# Patient Record
Sex: FEMALE | Race: WHITE | NOT HISPANIC OR LATINO | Employment: PART TIME | URBAN - METROPOLITAN AREA
[De-identification: names, ages, dates, MRNs, and addresses within clinical notes are randomized per-mention and may not be internally consistent; named-entity substitution may affect disease eponyms.]

---

## 2021-04-20 ENCOUNTER — OFFICE VISIT (OUTPATIENT)
Dept: OBGYN CLINIC | Facility: CLINIC | Age: 26
End: 2021-04-20
Payer: COMMERCIAL

## 2021-04-20 VITALS
DIASTOLIC BLOOD PRESSURE: 70 MMHG | BODY MASS INDEX: 36.43 KG/M2 | WEIGHT: 246 LBS | HEIGHT: 69 IN | SYSTOLIC BLOOD PRESSURE: 110 MMHG

## 2021-04-20 DIAGNOSIS — F41.9 ANXIETY AND DEPRESSION: Primary | ICD-10-CM

## 2021-04-20 DIAGNOSIS — F32.A ANXIETY AND DEPRESSION: Primary | ICD-10-CM

## 2021-04-20 PROCEDURE — 99212 OFFICE O/P EST SF 10 MIN: CPT | Performed by: NURSE PRACTITIONER

## 2021-04-20 RX ORDER — LEVONORGESTREL AND ETHINYL ESTRADIOL 0.1-0.02MG
1 KIT ORAL DAILY
COMMUNITY

## 2021-04-20 NOTE — PATIENT INSTRUCTIONS
Cont current Zoloft dose 50mg 1 tab daily  If has issues/concerns can increase dose further  Max dosing is 200mg daily  Commit to at least 6 mos at which time could wean off but does not have to

## 2021-04-20 NOTE — PROGRESS NOTES
Assessment/Plan:  Cont current Zoloft dose 50mg 1 tab daily  If has issues/concerns can increase dose further  Max dosing is 200mg daily  Commit to at least 6 mos at which time could wean off but does not have to  Diagnoses and all orders for this visit:    Anxiety and depression    Other orders  -     levonorgestrel-ethinyl estradiol (AVIANE,ALESSE,LESSINA) 0 1-20 MG-MCG per tablet; Take 1 tablet by mouth daily  -     sertraline (ZOLOFT) 50 mg tablet; Take 50 mg by mouth daily          Subjective:      Patient ID: Josette Wan is a 23 y o  female  Present for eval Zoloft dose  Seen for wellness 12/2020 PHQ 9= 14 mod depression  Started Zoloft 25 mg daily  Seen 1/18/2021 Scored 7 Increased to 37 5 mg  Called in March had not noticed any difference so increased dose to 50 mg daily  Feeling good like her oldself  She is happy  Can function, focus on her work  She is doing well in school  No more anxiety/panic  No hopelessness  The following portions of the patient's history were reviewed and updated as appropriate: allergies, current medications, past family history, past medical history, past social history, past surgical history and problem list     Review of Systems   Constitutional: Negative for fatigue  Psychiatric/Behavioral: Negative for decreased concentration, dysphoric mood and suicidal ideas  The patient is not nervous/anxious  Objective:      /70 (BP Location: Right arm, Patient Position: Sitting, Cuff Size: Large)   Ht 5' 8 5" (1 74 m)   Wt 112 kg (246 lb)   BMI 36 86 kg/m²          Physical Exam  Vitals signs and nursing note reviewed  Constitutional:       Appearance: Normal appearance  HENT:      Head: Normocephalic and atraumatic  Neurological:      General: No focal deficit present  Mental Status: She is oriented to person, place, and time     Psychiatric:         Mood and Affect: Mood normal          Behavior: Behavior normal          Thought Content:  Thought content normal          Judgment: Judgment normal

## 2022-01-02 DIAGNOSIS — F41.9 ANXIETY AND DEPRESSION: ICD-10-CM

## 2022-01-02 DIAGNOSIS — F32.A ANXIETY AND DEPRESSION: ICD-10-CM

## 2022-01-06 ENCOUNTER — ANNUAL EXAM (OUTPATIENT)
Dept: OBGYN CLINIC | Facility: CLINIC | Age: 27
End: 2022-01-06
Payer: COMMERCIAL

## 2022-01-06 VITALS
WEIGHT: 253 LBS | DIASTOLIC BLOOD PRESSURE: 70 MMHG | SYSTOLIC BLOOD PRESSURE: 110 MMHG | HEIGHT: 69 IN | BODY MASS INDEX: 37.47 KG/M2

## 2022-01-06 DIAGNOSIS — F32.A ANXIETY AND DEPRESSION: ICD-10-CM

## 2022-01-06 DIAGNOSIS — Z12.4 ENCOUNTER FOR PAPANICOLAOU SMEAR FOR CERVICAL CANCER SCREENING: ICD-10-CM

## 2022-01-06 DIAGNOSIS — Z01.419 ENCOUNTER FOR GYNECOLOGICAL EXAMINATION WITHOUT ABNORMAL FINDING: Primary | ICD-10-CM

## 2022-01-06 DIAGNOSIS — F41.9 ANXIETY AND DEPRESSION: ICD-10-CM

## 2022-01-06 DIAGNOSIS — Z11.3 SCREEN FOR STD (SEXUALLY TRANSMITTED DISEASE): ICD-10-CM

## 2022-01-06 DIAGNOSIS — Z80.3 FH: BREAST CANCER: ICD-10-CM

## 2022-01-06 PROCEDURE — S0612 ANNUAL GYNECOLOGICAL EXAMINA: HCPCS | Performed by: NURSE PRACTITIONER

## 2022-01-06 NOTE — PATIENT INSTRUCTIONS
Pap every 3 years if normal, STI testing as indicated, exercise most days of week, obtain appropriate diet and hydration, Calcium 1000mg + 600 vit D daily,   Annual mammogram starting at age 35s FH breast cancer grandmom x 2 and great grandmom  , monthly breast self exam    Continue current dose Zoloft

## 2022-01-06 NOTE — PROGRESS NOTES
Assessment/Plan:  Pap every 3 years if normal, STI testing as indicated, exercise most days of week, obtain appropriate diet and hydration, Calcium 1000mg + 600 vit D daily,   Annual mammogram starting at age 35s FH breast cancer grandmom x 2 and great grandmom  , monthly breast self exam       Diagnoses and all orders for this visit:    Encounter for gynecological examination without abnormal finding  -     IGP, CtNg, rfx Aptima HPV ASCU    Encounter for Papanicolaou smear for cervical cancer screening  -     IGP, CtNg, rfx Aptima HPV ASCU    Anxiety and depression  -     sertraline (ZOLOFT) 50 mg tablet; Take 1 tablet (50 mg total) by mouth daily    FH: breast cancer  Comments:  MGM, [address] for STD (sexually transmitted disease)  -     IGP, CtNg, rfx Aptima HPV ASCU          Subjective:      Patient ID: Michael Britton is a 27 y o  female  Here for annual gyn  Last pap 10/2018 neg  Prev on OCP D/Cd about 2 months ago Has lost some weight since coming off  Using condoms and ok with that  Periods regular  Denies abdominal or pelvic pain  Bowel and bladder are normal   NO unusual discharge  Was seen 1 year ago with depression and anxiety started Zoloft on 50mg daily and doing well  Occasional anxiety related to nursing fouzia  Has 1 1/2 years left  Due for pap  FH breast cancer MGM and MGGM  Thinks may have been dx in late 45s  The following portions of the patient's history were reviewed and updated as appropriate: allergies, current medications, past family history, past medical history, past social history, past surgical history and problem list     Review of Systems   Constitutional: Negative for fatigue and unexpected weight change  Gastrointestinal: Negative for abdominal distention, abdominal pain, constipation and diarrhea     Genitourinary: Negative for difficulty urinating, dyspareunia, dysuria, frequency, genital sores, menstrual problem, pelvic pain, urgency, vaginal bleeding, vaginal discharge and vaginal pain  Neurological: Negative for headaches  Psychiatric/Behavioral: Negative  Negative for dysphoric mood  The patient is not nervous/anxious  Objective:      /70 (BP Location: Left arm, Patient Position: Sitting, Cuff Size: Standard)   Ht 5' 9" (1 753 m)   Wt 115 kg (253 lb)   LMP 12/23/2021   BMI 37 36 kg/m²          Physical Exam  Vitals and nursing note reviewed  Constitutional:       General: She is not in acute distress  Appearance: Normal appearance  HENT:      Head: Normocephalic and atraumatic  Pulmonary:      Effort: Pulmonary effort is normal    Chest:   Breasts: Breasts are symmetrical       Right: Normal  No mass, nipple discharge, skin change, tenderness or axillary adenopathy  Left: Normal  No mass, nipple discharge, skin change, tenderness or axillary adenopathy  Abdominal:      General: There is no distension  Palpations: Abdomen is soft  Tenderness: There is no abdominal tenderness  There is no guarding or rebound  Genitourinary:     General: Normal vulva  Exam position: Lithotomy position  Labia:         Right: No rash, tenderness, lesion or injury  Left: No rash, tenderness, lesion or injury  Urethra: No prolapse, urethral pain, urethral swelling or urethral lesion  Vagina: Normal  No erythema or lesions  Cervix: No cervical motion tenderness, discharge, lesion or cervical bleeding  Uterus: Normal        Adnexa: Right adnexa normal and left adnexa normal         Right: No mass or tenderness  Left: No mass or tenderness  Rectum: No mass or external hemorrhoid  Comments: PAP from cervix  Musculoskeletal:         General: Normal range of motion  Lymphadenopathy:      Upper Body:      Right upper body: No axillary adenopathy  Left upper body: No axillary adenopathy  Lower Body: No right inguinal adenopathy  No left inguinal adenopathy  Skin:     General: Skin is warm and dry  Neurological:      Mental Status: She is alert and oriented to person, place, and time  Psychiatric:         Mood and Affect: Mood normal          Behavior: Behavior normal          Thought Content:  Thought content normal          Judgment: Judgment normal

## 2022-01-11 LAB
C TRACH RRNA CVX QL NAA+PROBE: NEGATIVE
CYTOLOGIST CVX/VAG CYTO: NORMAL
DX ICD CODE: NORMAL
N GONORRHOEA RRNA CVX QL NAA+PROBE: NEGATIVE
OTHER STN SPEC: NORMAL
OTHER STN SPEC: NORMAL
PATH REPORT.FINAL DX SPEC: NORMAL
SL AMB NOTE:: NORMAL
SL AMB SPECIMEN ADEQUACY: NORMAL
SL AMB TEST METHODOLOGY: NORMAL

## 2022-04-22 ENCOUNTER — TELEPHONE (OUTPATIENT)
Dept: OBGYN CLINIC | Facility: CLINIC | Age: 27
End: 2022-04-22

## 2022-04-22 NOTE — TELEPHONE ENCOUNTER
Nina called in to the nurses line with c/o of a possible chemical pregnancy  She was communicating with Rachelle Little via [address] regarding being late for her period and getting very faint lines on pregnancy test, but digital tests were saying negative  She started with cramping across her whole abdomen and bleeding heavier than her period yesterday  Bleeding has become lighter over time and cramping has decreased in pain over the evening  Skye Alvarez was wondering if there is anything she should do at this point  If she still should go for the blood work Rachelle Little was going to order for her  Advised Nina at this point it does sound like a chemical pregnancy  HPT today was negative  Informed Nina she will have bleeding and cramping this is normal with a chemical pregnancy her uterus is shedding the lining and fertilized egg  Informed her to keep an eye on her bleeding, if she is soaking a pad an hour or less to call the office, if she is having abdominal pain that has her double over in pain, or pain that is specific to one side to be evaluated in the ER for an ectopic pregnancy  Dr Ren Sargent,  Please advise Harish Hardy was just wondering if there is anything she should do, and if she should get an HCG like Rachelle Little offered in her Mobile[employer] message

## 2022-04-22 NOTE — TELEPHONE ENCOUNTER
Savannah Blankenship responded via [employer] to pt 4/22/22     FYI Dr Gardner Feeler no need to advise thank you

## 2022-07-08 ENCOUNTER — TELEPHONE (OUTPATIENT)
Dept: OBGYN CLINIC | Facility: CLINIC | Age: 27
End: 2022-07-08

## 2022-07-08 ENCOUNTER — OFFICE VISIT (OUTPATIENT)
Dept: OBGYN CLINIC | Facility: CLINIC | Age: 27
End: 2022-07-08
Payer: COMMERCIAL

## 2022-07-08 VITALS
DIASTOLIC BLOOD PRESSURE: 60 MMHG | SYSTOLIC BLOOD PRESSURE: 102 MMHG | HEIGHT: 69 IN | WEIGHT: 268 LBS | BODY MASS INDEX: 39.69 KG/M2

## 2022-07-08 DIAGNOSIS — R30.0 DYSURIA: Primary | ICD-10-CM

## 2022-07-08 DIAGNOSIS — R39.15 URINARY URGENCY: ICD-10-CM

## 2022-07-08 LAB
SL AMB  POCT GLUCOSE, UA: NEGATIVE
SL AMB LEUKOCYTE ESTERASE,UA: ABNORMAL
SL AMB POCT BLOOD,UA: ABNORMAL
SL AMB POCT COLOR,UA: ABNORMAL

## 2022-07-08 PROCEDURE — 99213 OFFICE O/P EST LOW 20 MIN: CPT | Performed by: OBSTETRICS & GYNECOLOGY

## 2022-07-08 PROCEDURE — 81002 URINALYSIS NONAUTO W/O SCOPE: CPT | Performed by: OBSTETRICS & GYNECOLOGY

## 2022-07-08 RX ORDER — NITROFURANTOIN 25; 75 MG/1; MG/1
100 CAPSULE ORAL 2 TIMES DAILY
Qty: 10 CAPSULE | Refills: 0 | Status: SHIPPED | OUTPATIENT
Start: 2022-07-08

## 2022-07-08 RX ORDER — PHENAZOPYRIDINE HYDROCHLORIDE 100 MG/1
100 TABLET, FILM COATED ORAL 3 TIMES DAILY PRN
Qty: 10 TABLET | Refills: 0 | Status: SHIPPED | OUTPATIENT
Start: 2022-07-08

## 2022-07-08 NOTE — PROGRESS NOTES
[id number] [facility], [address], [facility], [facility] [address]    Assessment/Plan:  1  Dysuria  A/P:   Symptoms consistent with UTI for 4 days and have not improved with OTC treatment  Pt used Azo - so Urinalysis in office difficult to interpret but looks + for blood and LE  Discussed with pt recom to sent culture and treat empirically  Also offered pyridium  Will call or msg with culture results when available  -     Urine culture  -     Urinalysis with microscopic  -     nitrofurantoin (MACROBID) 100 mg capsule; Take 1 capsule (100 mg total) by mouth 2 (two) times a day  -     phenazopyridine (PYRIDIUM) 100 mg tablet; Take 1 tablet (100 mg total) by mouth 3 (three) times a day as needed for bladder spasms    2  Urinary urgency  -     POCT urine dip        Subjective:   Joao Whitlock is a 24 y o  [facility] female  CC: I think I have a UTI      HPI:     UTI symptoms 4 days ago  She has complaints of burning with urination, cloudy urine, and she has the sensation that she is not completely emptying her bladder when she is using the restroom  She has tried utilizing Azo, Cranberry pills, cranberry juice, and increasing her water intake with no relief  Gyn History  Patient's last menstrual period was 2022 (exact date)  Last pap smear: 2022    She  reports being sexually active and has had partner(s) who are male  She reports using the following methods of birth control/protection: Condom and Spermicide         OB History      Past Medical History:  No date: Depression  10/24/2018: Papanicolaou smear  No date: Wears glasses     Past Surgical History:  No date: ANTERIOR CRUCIATE LIGAMENT REPAIR  No date: WISDOM TOOTH EXTRACTION     Social History     Tobacco Use    Smoking status: Former Smoker    Smokeless tobacco: Never Used   Vaping Use    Vaping Use: Every day    Substances: Nicotine   Substance Use Topics    Alcohol use: Yes     Comment: Special occasions (1 or less/month)     Drug use: Never     Comment: No           Current Outpatient Medications:     nitrofurantoin (MACROBID) 100 mg capsule, Take 1 capsule (100 mg total) by mouth 2 (two) times a day, Disp: 10 capsule, Rfl: 0    phenazopyridine (PYRIDIUM) 100 mg tablet, Take 1 tablet (100 mg total) by mouth 3 (three) times a day as needed for bladder spasms, Disp: 10 tablet, Rfl: 0    sertraline (ZOLOFT) 50 mg tablet, Take 1 tablet (50 mg total) by mouth daily, Disp: 90 tablet, Rfl: 3    levonorgestrel-ethinyl estradiol (AVIANE,ALESSE,LESSINA) 0 1-20 MG-MCG per tablet, Take 1 tablet by mouth daily (Patient not taking: Reported on 7/8/2022), Disp: , Rfl:     She has No Known Allergies       ROS: Review of Systems   Constitutional: Negative  Gastrointestinal: Negative  Genitourinary: Positive for difficulty urinating, dysuria and frequency  Negative for vaginal bleeding and vaginal discharge  Psychiatric/Behavioral: Negative  Objective:  /60 (BP Location: Left arm, Patient Position: Sitting, Cuff Size: Large)   Ht 5' 8 5" (1 74 m)   Wt 122 kg (268 lb)   LMP 06/21/2022 (Exact Date)   Breastfeeding No   BMI 40 16 kg/m²      Physical Exam  Constitutional:       Appearance: Normal appearance  Neurological:      Mental Status: She is alert and oriented to person, place, and time     Psychiatric:         Behavior: Behavior normal

## 2022-07-08 NOTE — TELEPHONE ENCOUNTER
----- Message from [place]  Formerly named [facility] sent at 7/8/2022  4:08 AM EDT -----  Regarding: UTI  Hi Fletcher Patino, I was wondering if it was possible if you could write me a a r for antibiotics for a uti   I am going on day 4 with it drinking lots of water but its not clearing up

## 2022-07-08 NOTE — TELEPHONE ENCOUNTER
Nina called back and left a message for a nurse to return her call  Call placed back to [facility]  Nina started with UTI symptoms 4 days ago  She has complaints of burning with urination, cloudy urine, and she has the sensation that she is not completely emptying her bladder when she is using the restroom  She has tried utilizing Azo, Cranberry pills, cranberry juice, and increasing her water intake with no relief  [facility] was wondering if we could call in an antibiotic for her  Advised [facility] that is not something we can do without a urine culture being completed or without her being seen in the office  Booked an appointment for Nina with Dr Daljit Becerra at the [facility] location at 10:40

## 2022-07-14 LAB
APPEARANCE UR: CLEAR
BACTERIA UR CULT: ABNORMAL
BACTERIA URNS QL MICRO: NORMAL
BILIRUB UR QL STRIP: NEGATIVE
CASTS URNS QL MICRO: NORMAL /LPF
COLOR UR: ABNORMAL
EPI CELLS #/AREA URNS HPF: NORMAL /HPF (ref 0–10)
GLUCOSE UR QL: NEGATIVE
HGB UR QL STRIP: NEGATIVE
KETONES UR QL STRIP: NEGATIVE
LEUKOCYTE ESTERASE UR QL STRIP: ABNORMAL
Lab: ABNORMAL
MICRO URNS: ABNORMAL
NITRITE UR QL STRIP: POSITIVE
PH UR STRIP: 6 [PH] (ref 5–7.5)
PROT UR QL STRIP: NEGATIVE
RBC #/AREA URNS HPF: NORMAL /HPF (ref 0–2)
SL AMB ANTIMICROBIAL SUSCEPTIBILITY: ABNORMAL
SP GR UR: 1.01 (ref 1–1.03)
UROBILINOGEN UR STRIP-ACNC: 1 MG/DL (ref 0.2–1)
WBC #/AREA URNS HPF: NORMAL /HPF (ref 0–5)

## 2023-03-27 NOTE — PROGRESS NOTES
Assessment/Plan:  Pap every 3 years if normal, STI testing as indicated, exercise most days of week, obtain appropriate diet and hydration, Calcium 1000mg + 600 vit D daily,  Annual mammogram starting at age 36, monthly breast self exam       Diagnoses and all orders for this visit:    Encounter for gynecological examination without abnormal finding    Screen for STD (sexually transmitted disease)    FH: breast cancer    Anxiety and depression    Other orders  -     Liquid-based pap, screening  -     sertraline (Zoloft) 25 mg tablet          Subjective:      Patient ID: Cody Ram is a 24 y o  female  Here for annual gyn G0 ? Early miscarriage last year Hd been a few days late for period HPT very faint line other tests negative  She then a week later had cramping and a period  HPT that day was negative  Periods regular x 4 days Cramps Day 1   Denies abdominal or pelvic pain  Bowel and bladder are normal   NO unusual discharge    Last pap 1/16/2022 neg prior 10/2018 neg  Prev on OCP D/Cd last year Using condoms and ok with that  LMP 3/8/2023 H/o depression and anxiety  Zoloft 75 mg daily and doing well  Graduates [employer] this year  Interviewing for [facility] breast cancer MGM and MGGM  Engaged getting  9/2024 Julia Soto       The following portions of the patient's history were reviewed and updated as appropriate: allergies, current medications, past family history, past medical history, past social history, past surgical history and problem list     Review of Systems   Constitutional: Negative for fatigue and unexpected weight change  Gastrointestinal: Negative for abdominal distention, abdominal pain, constipation and diarrhea  Genitourinary: Negative for difficulty urinating, dyspareunia, dysuria, frequency, genital sores, menstrual problem, pelvic pain, urgency, vaginal bleeding, vaginal discharge and vaginal pain  Neurological: Negative for headaches  "  Psychiatric/Behavioral: Negative  Negative for dysphoric mood  The patient is not nervous/anxious  Objective:      /70 (BP Location: Right arm, Patient Position: Sitting, Cuff Size: Standard)   Ht 5' 8 5\" (1 74 m)   Wt 123 kg (272 lb 3 2 oz)   LMP 03/08/2023 (Exact Date)   Breastfeeding No   BMI 40 79 kg/m²          Physical Exam  Vitals and nursing note reviewed  Constitutional:       General: She is not in acute distress  Appearance: Normal appearance  HENT:      Head: Normocephalic and atraumatic  Pulmonary:      Effort: Pulmonary effort is normal    Chest:   Breasts:     Breasts are symmetrical       Right: Normal  No mass, nipple discharge, skin change or tenderness  Left: Normal  No mass, nipple discharge, skin change or tenderness  Abdominal:      General: There is no distension  Palpations: Abdomen is soft  Tenderness: There is no abdominal tenderness  There is no guarding or rebound  Genitourinary:     General: Normal vulva  Exam position: Lithotomy position  Labia:         Right: No rash, tenderness, lesion or injury  Left: No rash, tenderness, lesion or injury  Urethra: No prolapse, urethral pain, urethral swelling or urethral lesion  Vagina: Normal  No erythema or lesions  Cervix: No cervical motion tenderness, discharge, lesion or cervical bleeding  Uterus: Normal        Adnexa: Right adnexa normal and left adnexa normal         Right: No mass or tenderness  Left: No mass or tenderness  Rectum: No mass or external hemorrhoid  Musculoskeletal:         General: Normal range of motion  Lymphadenopathy:      Upper Body:      Right upper body: No axillary adenopathy  Left upper body: No axillary adenopathy  Lower Body: No right inguinal adenopathy  No left inguinal adenopathy  Skin:     General: Skin is warm and dry     Neurological:      Mental Status: She is alert and oriented to " person, place, and time  Psychiatric:         Mood and Affect: Mood normal          Behavior: Behavior normal          Thought Content:  Thought content normal          Judgment: Judgment normal

## 2023-03-28 ENCOUNTER — ANNUAL EXAM (OUTPATIENT)
Dept: OBGYN CLINIC | Facility: CLINIC | Age: 28
End: 2023-03-28

## 2023-03-28 VITALS
DIASTOLIC BLOOD PRESSURE: 70 MMHG | BODY MASS INDEX: 40.32 KG/M2 | HEIGHT: 69 IN | WEIGHT: 272.2 LBS | SYSTOLIC BLOOD PRESSURE: 102 MMHG

## 2023-03-28 DIAGNOSIS — F32.A ANXIETY AND DEPRESSION: ICD-10-CM

## 2023-03-28 DIAGNOSIS — F41.9 ANXIETY AND DEPRESSION: ICD-10-CM

## 2023-03-28 DIAGNOSIS — Z01.419 ENCOUNTER FOR GYNECOLOGICAL EXAMINATION WITHOUT ABNORMAL FINDING: Primary | ICD-10-CM

## 2023-03-28 DIAGNOSIS — Z80.3 FH: BREAST CANCER: ICD-10-CM

## 2023-03-28 RX ORDER — SERTRALINE HYDROCHLORIDE 25 MG/1
TABLET, FILM COATED ORAL
COMMUNITY
Start: 2023-03-10

## 2023-03-28 NOTE — PATIENT INSTRUCTIONS
Pap every 3 years if normal, STI testing as indicated, exercise most days of week, obtain appropriate diet and hydration, Calcium 1000mg + 600 vit D daily,  Annual mammogram starting at age 36, monthly breast self exam